# Patient Record
Sex: FEMALE | Race: OTHER | NOT HISPANIC OR LATINO
[De-identification: names, ages, dates, MRNs, and addresses within clinical notes are randomized per-mention and may not be internally consistent; named-entity substitution may affect disease eponyms.]

---

## 2022-01-28 PROBLEM — Z00.00 ENCOUNTER FOR PREVENTIVE HEALTH EXAMINATION: Status: ACTIVE | Noted: 2022-01-28

## 2022-03-01 ENCOUNTER — APPOINTMENT (OUTPATIENT)
Dept: CARDIOLOGY | Facility: CLINIC | Age: 62
End: 2022-03-01
Payer: MEDICAID

## 2022-03-01 ENCOUNTER — EMERGENCY (EMERGENCY)
Facility: HOSPITAL | Age: 62
LOS: 0 days | Discharge: HOME | End: 2022-03-02
Attending: EMERGENCY MEDICINE | Admitting: STUDENT IN AN ORGANIZED HEALTH CARE EDUCATION/TRAINING PROGRAM
Payer: MEDICAID

## 2022-03-01 VITALS
RESPIRATION RATE: 18 BRPM | OXYGEN SATURATION: 98 % | WEIGHT: 175.05 LBS | HEART RATE: 91 BPM | HEIGHT: 63 IN | SYSTOLIC BLOOD PRESSURE: 183 MMHG | TEMPERATURE: 98 F | DIASTOLIC BLOOD PRESSURE: 76 MMHG

## 2022-03-01 VITALS
TEMPERATURE: 96 F | DIASTOLIC BLOOD PRESSURE: 79 MMHG | SYSTOLIC BLOOD PRESSURE: 127 MMHG | HEART RATE: 82 BPM | WEIGHT: 162 LBS | HEIGHT: 63 IN | BODY MASS INDEX: 28.7 KG/M2

## 2022-03-01 DIAGNOSIS — I10 ESSENTIAL (PRIMARY) HYPERTENSION: ICD-10-CM

## 2022-03-01 DIAGNOSIS — R07.89 OTHER CHEST PAIN: ICD-10-CM

## 2022-03-01 DIAGNOSIS — R07.9 CHEST PAIN, UNSPECIFIED: ICD-10-CM

## 2022-03-01 DIAGNOSIS — Z82.3 FAMILY HISTORY OF STROKE: ICD-10-CM

## 2022-03-01 DIAGNOSIS — Z78.9 OTHER SPECIFIED HEALTH STATUS: ICD-10-CM

## 2022-03-01 DIAGNOSIS — Z83.3 FAMILY HISTORY OF DIABETES MELLITUS: ICD-10-CM

## 2022-03-01 DIAGNOSIS — Z82.49 FAMILY HISTORY OF ISCHEMIC HEART DISEASE AND OTHER DISEASES OF THE CIRCULATORY SYSTEM: ICD-10-CM

## 2022-03-01 DIAGNOSIS — E07.9 DISORDER OF THYROID, UNSPECIFIED: ICD-10-CM

## 2022-03-01 DIAGNOSIS — Z20.822 CONTACT WITH AND (SUSPECTED) EXPOSURE TO COVID-19: ICD-10-CM

## 2022-03-01 LAB
ALBUMIN SERPL ELPH-MCNC: 4.6 G/DL — SIGNIFICANT CHANGE UP (ref 3.5–5.2)
ALP SERPL-CCNC: 55 U/L — SIGNIFICANT CHANGE UP (ref 30–115)
ALT FLD-CCNC: 20 U/L — SIGNIFICANT CHANGE UP (ref 0–41)
ANION GAP SERPL CALC-SCNC: 14 MMOL/L — SIGNIFICANT CHANGE UP (ref 7–14)
AST SERPL-CCNC: 44 U/L — HIGH (ref 0–41)
BASOPHILS # BLD AUTO: 0.05 K/UL — SIGNIFICANT CHANGE UP (ref 0–0.2)
BASOPHILS NFR BLD AUTO: 0.9 % — SIGNIFICANT CHANGE UP (ref 0–1)
BILIRUB SERPL-MCNC: 0.3 MG/DL — SIGNIFICANT CHANGE UP (ref 0.2–1.2)
BUN SERPL-MCNC: 12 MG/DL — SIGNIFICANT CHANGE UP (ref 10–20)
CALCIUM SERPL-MCNC: 9.3 MG/DL — SIGNIFICANT CHANGE UP (ref 8.5–10.1)
CHLORIDE SERPL-SCNC: 100 MMOL/L — SIGNIFICANT CHANGE UP (ref 98–110)
CO2 SERPL-SCNC: 23 MMOL/L — SIGNIFICANT CHANGE UP (ref 17–32)
CREAT SERPL-MCNC: 0.7 MG/DL — SIGNIFICANT CHANGE UP (ref 0.7–1.5)
EGFR: 98 ML/MIN/1.73M2 — SIGNIFICANT CHANGE UP
EOSINOPHIL # BLD AUTO: 0.07 K/UL — SIGNIFICANT CHANGE UP (ref 0–0.7)
EOSINOPHIL NFR BLD AUTO: 1.3 % — SIGNIFICANT CHANGE UP (ref 0–8)
GLUCOSE SERPL-MCNC: 89 MG/DL — SIGNIFICANT CHANGE UP (ref 70–99)
HCT VFR BLD CALC: 41.3 % — SIGNIFICANT CHANGE UP (ref 37–47)
HGB BLD-MCNC: 13.2 G/DL — SIGNIFICANT CHANGE UP (ref 12–16)
IMM GRANULOCYTES NFR BLD AUTO: 0.4 % — HIGH (ref 0.1–0.3)
LYMPHOCYTES # BLD AUTO: 1.53 K/UL — SIGNIFICANT CHANGE UP (ref 1.2–3.4)
LYMPHOCYTES # BLD AUTO: 28.8 % — SIGNIFICANT CHANGE UP (ref 20.5–51.1)
MAGNESIUM SERPL-MCNC: 2 MG/DL — SIGNIFICANT CHANGE UP (ref 1.8–2.4)
MCHC RBC-ENTMCNC: 26.2 PG — LOW (ref 27–31)
MCHC RBC-ENTMCNC: 32 G/DL — SIGNIFICANT CHANGE UP (ref 32–37)
MCV RBC AUTO: 81.9 FL — SIGNIFICANT CHANGE UP (ref 81–99)
MONOCYTES # BLD AUTO: 0.48 K/UL — SIGNIFICANT CHANGE UP (ref 0.1–0.6)
MONOCYTES NFR BLD AUTO: 9 % — SIGNIFICANT CHANGE UP (ref 1.7–9.3)
NEUTROPHILS # BLD AUTO: 3.16 K/UL — SIGNIFICANT CHANGE UP (ref 1.4–6.5)
NEUTROPHILS NFR BLD AUTO: 59.6 % — SIGNIFICANT CHANGE UP (ref 42.2–75.2)
NRBC # BLD: 0 /100 WBCS — SIGNIFICANT CHANGE UP (ref 0–0)
NT-PROBNP SERPL-SCNC: 77 PG/ML — SIGNIFICANT CHANGE UP (ref 0–300)
PLATELET # BLD AUTO: 219 K/UL — SIGNIFICANT CHANGE UP (ref 130–400)
POTASSIUM SERPL-MCNC: 5 MMOL/L — SIGNIFICANT CHANGE UP (ref 3.5–5)
POTASSIUM SERPL-SCNC: 5 MMOL/L — SIGNIFICANT CHANGE UP (ref 3.5–5)
PROT SERPL-MCNC: 7.6 G/DL — SIGNIFICANT CHANGE UP (ref 6–8)
RBC # BLD: 5.04 M/UL — SIGNIFICANT CHANGE UP (ref 4.2–5.4)
RBC # FLD: 13.4 % — SIGNIFICANT CHANGE UP (ref 11.5–14.5)
SARS-COV-2 RNA SPEC QL NAA+PROBE: SIGNIFICANT CHANGE UP
SODIUM SERPL-SCNC: 137 MMOL/L — SIGNIFICANT CHANGE UP (ref 135–146)
TROPONIN T SERPL-MCNC: <0.01 NG/ML — SIGNIFICANT CHANGE UP
TROPONIN T SERPL-MCNC: <0.01 NG/ML — SIGNIFICANT CHANGE UP
WBC # BLD: 5.31 K/UL — SIGNIFICANT CHANGE UP (ref 4.8–10.8)
WBC # FLD AUTO: 5.31 K/UL — SIGNIFICANT CHANGE UP (ref 4.8–10.8)

## 2022-03-01 PROCEDURE — 99204 OFFICE O/P NEW MOD 45 MIN: CPT

## 2022-03-01 PROCEDURE — 93000 ELECTROCARDIOGRAM COMPLETE: CPT

## 2022-03-01 PROCEDURE — 71046 X-RAY EXAM CHEST 2 VIEWS: CPT | Mod: 26

## 2022-03-01 PROCEDURE — 99218: CPT

## 2022-03-01 PROCEDURE — 93010 ELECTROCARDIOGRAM REPORT: CPT | Mod: 76

## 2022-03-01 RX ORDER — ASPIRIN/CALCIUM CARB/MAGNESIUM 324 MG
324 TABLET ORAL ONCE
Refills: 0 | Status: COMPLETED | OUTPATIENT
Start: 2022-03-01 | End: 2022-03-01

## 2022-03-01 RX ADMIN — Medication 324 MILLIGRAM(S): at 14:55

## 2022-03-01 NOTE — ED PROVIDER NOTE - OBJECTIVE STATEMENT
62 yo female with a pmh of HTN presents c/o chest pain that started last night. pt describes her pain as pressure in nature to the midsternal region with radiation to her L arm. pt states that pain as been intermittent with no notes aggravating or alleviating factors. pt also mention to sometimes become slightly SOB when climbing stairs over the past month. pt denies any other symptoms including fevers, chill, headache, recent illness/travel, cough, abdominal pain.

## 2022-03-01 NOTE — REASON FOR VISIT
[Arrhythmia/ECG Abnorrmalities] : arrhythmia/ECG abnormalities [FreeTextEntry3] : Dr. Evaristo Ingram

## 2022-03-01 NOTE — HISTORY OF PRESENT ILLNESS
[FreeTextEntry1] : Referring Internist: Dr. Evaristo Ingram\par \par hypertension, hyperlipidemia, \par long standing history of palpitations, for few years, severe, brief episodes\par improved on Toprol 200 mg \par also reports chest pain pressure like, with numbness left arm; last time had chest pain was this am\par sedentary lifestyle; does not exert herself\par She has no shortness of breath, no dyspnea on exertion, no orthopnea, no PND. She denies dizziness, lightheadedness and syncope. She presents for evaluation.\par \par \par

## 2022-03-01 NOTE — DISCUSSION/SUMMARY
[FreeTextEntry1] : Chest pain, left sided, pressure-like; last episode this am\par -recommend continue Metoprolol\par -go to ER immediately for management, troponins, possible CTA coronaries\par -Patient and daughter in law declined calling ambulance; she will go on her own to ER\par \par Palpitations\par -continue Metoprolol\par - Since her symptoms do not occur on a daily basis, a Holter monitor is less likely to be helpful in her management. I recommend a 4 weeks event monitor to evaluate for the etiology of her symptoms. I educated the patient on the use of symptoms’ trigger feature of the event monitor. I will reassess patient after completion of the 4 weeks event monitor.\par \par If MCOT negative, will proceed with ILR\par \par I discussed with her the plan of care in great details. I answered all her questions and she was satisfied with the visit. Patient will follow with me in 4-6 weeks' time. Please do not hesitate to contact me at [phone] if you have any questions regarding her care.\par \par \par

## 2022-03-01 NOTE — ED CDU PROVIDER INITIAL DAY NOTE - OBJECTIVE STATEMENT
60 yo F hx of HTN, tachycardia c/o intermittent left chest pain radiating to left arm since last night. No SOB, n/v, abdominal pains or leg pains/leg swelling. Patient was referred to ED by Dr Weaver for evaluation. No smoking or drugs.

## 2022-03-01 NOTE — ED ADULT NURSE REASSESSMENT NOTE - NS ED NURSE REASSESS COMMENT FT1
Patient is a GCS of 15. Patient can follow complex and simple commands.  Patient educated on medical treatment plan and medications. VSS. Will continue to monitor patient for acute changes in vital signs.

## 2022-03-01 NOTE — ED CDU PROVIDER INITIAL DAY NOTE - PHYSICAL EXAMINATION
Gen: Alert, NAD, well appearing  Head: NC, AT, PERRL, EOMI, normal lids/conjunctiva  ENT: normal hearing,  Neck: +supple, no tenderness/meningismus,  Pulm: Bilateral BS, normal resp effort, no wheeze/stridor/retractions  CV: RRR  Abd: soft, NT/ND  Mskel: no edema/erythema/cyanosis  Skin: no rash, warm/dry  Neuro: AAOx3, no sensory/motor deficits

## 2022-03-01 NOTE — ED PROVIDER NOTE - PROGRESS NOTE DETAILS
contacted by EP who would like pt to be placed in OBS for ccta and contacted prior to discharge for Holter monitor placement.

## 2022-03-01 NOTE — ED PROVIDER NOTE - ATTENDING CONTRIBUTION TO CARE
62 yo f hx htn, episodes of tachycardia presents for eval of CP  pain began last night. pt describes pain as a midsternal pressure. pt endorses some radiation to L arm. pain has been intermittent w/o clear exacerbating or alleviating factors. +mild ORTIZ for the past month. no f/c/cough/n/v/d/ap.  pt had seen EP and was ref to ED for acs r/o    vss  gen- NAD, aaox3  card-rrr  lungs-ctab, no wheezing or rhonchi  abd-sntnd, no guarding or rebound  neuro- full str/sensation, cn ii-xii grossly intact, normal coordination

## 2022-03-02 VITALS
DIASTOLIC BLOOD PRESSURE: 61 MMHG | RESPIRATION RATE: 18 BRPM | SYSTOLIC BLOOD PRESSURE: 124 MMHG | HEART RATE: 72 BPM | OXYGEN SATURATION: 100 %

## 2022-03-02 PROCEDURE — 99217: CPT

## 2022-03-02 PROCEDURE — 78452 HT MUSCLE IMAGE SPECT MULT: CPT | Mod: 26,MA

## 2022-03-02 PROCEDURE — 93018 CV STRESS TEST I&R ONLY: CPT

## 2022-03-02 PROCEDURE — 93016 CV STRESS TEST SUPVJ ONLY: CPT

## 2022-03-02 RX ORDER — METOPROLOL TARTRATE 50 MG
100 TABLET ORAL ONCE
Refills: 0 | Status: COMPLETED | OUTPATIENT
Start: 2022-03-02 | End: 2022-03-02

## 2022-03-02 RX ORDER — ADENOSINE 3 MG/ML
60 INJECTION INTRAVENOUS ONCE
Refills: 0 | Status: COMPLETED | OUTPATIENT
Start: 2022-03-02 | End: 2022-03-02

## 2022-03-02 RX ADMIN — Medication 100 MILLIGRAM(S): at 08:00

## 2022-03-02 RX ADMIN — ADENOSINE 600 MILLIGRAM(S): 3 INJECTION INTRAVENOUS at 11:03

## 2022-03-02 RX ADMIN — ADENOSINE 60 MILLIGRAM(S): 3 INJECTION INTRAVENOUS at 11:14

## 2022-03-02 RX ADMIN — Medication 100 MILLIGRAM(S): at 06:20

## 2022-03-02 NOTE — ED ADULT NURSE REASSESSMENT NOTE - NS ED NURSE REASSESS COMMENT FT1
Received report from prior RN. Pt A&Ox4, ambulatory baseline. Pt on Tele/O2 monitor, by RN station. Fall precautions maintained. Comfort measures offered. No s/s of distress noted, will f/u.

## 2022-03-02 NOTE — CHART NOTE - NSCHARTNOTEFT_GEN_A_CORE
MCOT placed on patient  All instructions given and questions answered  Patient will follow up with Dr Vázquez in 4-6 weeks    No further EP intervention  Recall as needed [id number]
Patient seen and evaluated by Dr Vázquez in the office today for chest pain and palpitations    Plan:  - CCTA to r/o ischemia  - MCOT prior to discharge    Full consult to follow tomorrow  Call EP with questions [phone]

## 2022-03-02 NOTE — ED CDU PROVIDER DISPOSITION NOTE - PATIENT PORTAL LINK FT
You can access the FollowMyHealth Patient Portal offered by [facility] by registering at the following website: [email or web address]. By joining [facility]’s FollowMyHealth portal, you will also be able to view your health information using other applications (apps) compatible with our system.

## 2022-03-02 NOTE — ED CDU PROVIDER DISPOSITION NOTE - CARE PROVIDER_API CALL
Juan R Vázquez (MD)  Cardiac Electrophysiology; Cardiovascular Disease; [facility]  [address] [place] [address]  Phone: [phone]  Fax: [phone]  Follow Up Time: Routine

## 2022-03-02 NOTE — ED CDU PROVIDER DISPOSITION NOTE - CLINICAL COURSE
pt presented to ed for eval of chest pain. pt placed in edou under low risk chest pain protocol. pt with workup including labs wnl, including 2 sets of negative cardiac enzymes, 2 ekg with no ischemic changes, normal cxray, normal nuclear stress test, no events on cardiac monitor. no chest pain at time of dc. pt advised to f/u with cardiology. Return for any concerning signs or symptoms. pt presented to ed for eval of chest pain. pt placed in edou under low risk chest pain protocol. pt with workup including labs wnl, including 2 sets of negative cardiac enzymes, 2 ekg with no ischemic changes, normal cxray, normal nuclear stress test, no events on cardiac monitor. no chest pain at time of dc. pt advised to f/u with cardiology. Return for any concerning signs or symptoms. The patient was also seen by EP given event monitor. outpt f/u for this in 4-6 weeks, pt aware

## 2022-03-03 PROBLEM — I10 ESSENTIAL (PRIMARY) HYPERTENSION: Chronic | Status: ACTIVE | Noted: 2022-03-01

## 2022-04-11 ENCOUNTER — APPOINTMENT (OUTPATIENT)
Dept: CARDIOLOGY | Facility: CLINIC | Age: 62
End: 2022-04-11

## 2022-04-25 ENCOUNTER — NON-APPOINTMENT (OUTPATIENT)
Age: 62
End: 2022-04-25

## 2022-05-24 ENCOUNTER — APPOINTMENT (OUTPATIENT)
Dept: CARDIOLOGY | Facility: CLINIC | Age: 62
End: 2022-05-24
Payer: MEDICAID

## 2022-05-24 VITALS
RESPIRATION RATE: 16 BRPM | WEIGHT: 180 LBS | BODY MASS INDEX: 31.89 KG/M2 | TEMPERATURE: 98 F | SYSTOLIC BLOOD PRESSURE: 120 MMHG | HEART RATE: 80 BPM | HEIGHT: 63 IN | DIASTOLIC BLOOD PRESSURE: 65 MMHG

## 2022-05-24 DIAGNOSIS — E78.00 PURE HYPERCHOLESTEROLEMIA, UNSPECIFIED: ICD-10-CM

## 2022-05-24 DIAGNOSIS — Z87.898 PERSONAL HISTORY OF OTHER SPECIFIED CONDITIONS: ICD-10-CM

## 2022-05-24 PROCEDURE — 99215 OFFICE O/P EST HI 40 MIN: CPT

## 2022-05-24 PROCEDURE — 93000 ELECTROCARDIOGRAM COMPLETE: CPT

## 2022-05-24 NOTE — DISCUSSION/SUMMARY
[FreeTextEntry1] : Ms. Cassidy Peralta is a pleasant 61 year-old woman with hypertension, hyperlipidemia, intermittent palpitations and symptoms suggestive of SVT with sudden onset/offset of palpiations.\par \par -I recommend continue Metoprolol\par \par - I reviewed with patient the results of MCOT in details. \par \par The possible causes of her supraventricular tachycardia are AV francisco j reentry, concealed accessory bypass tract or atrial tachycardia. Due to fact that she is having moderate symptoms, she is a candidate for electrophysiology study and catheter ablation. A discussion of this procedure was discussed at length. She was informed that the procedure would be performed under moderate sedation and the procedure duration is about three hours. The success rate is 90-95% with a 5-10% recurrence. We also discussed possible complications, which include but are not limited to groin complications, bleeding, vascular injury, perforation, arrhythmias, AV block and the need for permanent pacemaker, cardiac tamponade, need for surgery, and rare risks of stroke/heart attack/death.\par \par Procedure will be planned on 6/22/2022\par \par If EP study is negative, I will consider her for EP study.\par \par She verbalized understanding of the discussion and all questions were addressed and answered. She expressed agreement in proceeding with EP study and ablation. My  will be in contact with her for finalizing date, preadmission testing and instructions. Patient will follow with me in 2 months’ time. Please do not hesitate to contact me at [phone] if you have any further questions regarding this patient care.\par

## 2022-05-24 NOTE — HISTORY OF PRESENT ILLNESS
[FreeTextEntry1] : Referring Internist: Dr. Evaristo Ingram\par \par hypertension, hyperlipidemia, \par long standing history of palpitations, for few years, severe, brief episodes\par improved on Toprol 200 mg \par also reports chest pain pressure like, with numbness left arm; last time had chest pain was this am\par sedentary lifestyle; does not exert herself\par seen in March 2021 and given MCOT: no significant arrhythmias. \par Patient had no severe palpitations during wearing MCOT. She had 2 episodes of severe palpitations sudden onset and sudden offset lasting few minutes.\par She has no shortness of breath, no dyspnea on exertion, no orthopnea, no PND. She denies dizziness, lightheadedness and syncope. She presents for evaluation.\par \par \par

## 2022-05-24 NOTE — CARDIOLOGY SUMMARY
[de-identified] : (5/24/2022): sinus rhythm at 80 bpm, no significant ST/T abnormalities\par (3/1/2022): sinus rhythm at 82 bpm, T wave inversion lead III [de-identified] : EVENT MONITOR/MCOT RESULT SUMMARY:\par (see scanned report from tracings)\par \par Dates: 3/2/2022 to 3/31/2022\par Duration: 27.5 days\par Average heart rate (bpm): 77\par Range heart rate (bpm): \par Tachyarrhythmias: no Atrial Fibrillation, no sustained SVT, no sustained VT\par Bradyarrhythmias: no pauses, no significant bradycardia\par PVC burden: <1%\par APC burden: <1%\par \par SUMMARY: No significant arrhythmias [de-identified] : (03/02/2022) Nuclear stress test: no ischemia - EF 71%

## 2022-06-07 ENCOUNTER — TRANSCRIPTION ENCOUNTER (OUTPATIENT)
Age: 62
End: 2022-06-07

## 2022-06-08 ENCOUNTER — OUTPATIENT (OUTPATIENT)
Dept: OUTPATIENT SERVICES | Facility: HOSPITAL | Age: 62
LOS: 1 days | Discharge: HOME | End: 2022-06-08
Payer: MEDICAID

## 2022-06-08 VITALS
HEIGHT: 59.06 IN | WEIGHT: 165.35 LBS | SYSTOLIC BLOOD PRESSURE: 174 MMHG | RESPIRATION RATE: 18 BRPM | OXYGEN SATURATION: 100 % | DIASTOLIC BLOOD PRESSURE: 73 MMHG | HEART RATE: 80 BPM | TEMPERATURE: 98 F

## 2022-06-08 DIAGNOSIS — I47.1 SUPRAVENTRICULAR TACHYCARDIA: ICD-10-CM

## 2022-06-08 DIAGNOSIS — E89.0 POSTPROCEDURAL HYPOTHYROIDISM: Chronic | ICD-10-CM

## 2022-06-08 DIAGNOSIS — Z98.890 OTHER SPECIFIED POSTPROCEDURAL STATES: Chronic | ICD-10-CM

## 2022-06-08 DIAGNOSIS — Z01.818 ENCOUNTER FOR OTHER PREPROCEDURAL EXAMINATION: ICD-10-CM

## 2022-06-08 DIAGNOSIS — Z90.49 ACQUIRED ABSENCE OF OTHER SPECIFIED PARTS OF DIGESTIVE TRACT: Chronic | ICD-10-CM

## 2022-06-08 DIAGNOSIS — Z90.89 ACQUIRED ABSENCE OF OTHER ORGANS: Chronic | ICD-10-CM

## 2022-06-08 LAB
ALBUMIN SERPL ELPH-MCNC: 4.9 G/DL — SIGNIFICANT CHANGE UP (ref 3.5–5.2)
ALP SERPL-CCNC: 58 U/L — SIGNIFICANT CHANGE UP (ref 30–115)
ALT FLD-CCNC: 20 U/L — SIGNIFICANT CHANGE UP (ref 0–41)
ANION GAP SERPL CALC-SCNC: 13 MMOL/L — SIGNIFICANT CHANGE UP (ref 7–14)
APTT BLD: 29.2 SEC — SIGNIFICANT CHANGE UP (ref 27–39.2)
AST SERPL-CCNC: 32 U/L — SIGNIFICANT CHANGE UP (ref 0–41)
BASOPHILS # BLD AUTO: 0.06 K/UL — SIGNIFICANT CHANGE UP (ref 0–0.2)
BASOPHILS NFR BLD AUTO: 1 % — SIGNIFICANT CHANGE UP (ref 0–1)
BILIRUB SERPL-MCNC: 0.4 MG/DL — SIGNIFICANT CHANGE UP (ref 0.2–1.2)
BUN SERPL-MCNC: 8 MG/DL — LOW (ref 10–20)
CALCIUM SERPL-MCNC: 9.2 MG/DL — SIGNIFICANT CHANGE UP (ref 8.5–10.1)
CHLORIDE SERPL-SCNC: 98 MMOL/L — SIGNIFICANT CHANGE UP (ref 98–110)
CO2 SERPL-SCNC: 26 MMOL/L — SIGNIFICANT CHANGE UP (ref 17–32)
CREAT SERPL-MCNC: 0.7 MG/DL — SIGNIFICANT CHANGE UP (ref 0.7–1.5)
EGFR: 98 ML/MIN/1.73M2 — SIGNIFICANT CHANGE UP
EOSINOPHIL # BLD AUTO: 0.09 K/UL — SIGNIFICANT CHANGE UP (ref 0–0.7)
EOSINOPHIL NFR BLD AUTO: 1.5 % — SIGNIFICANT CHANGE UP (ref 0–8)
GLUCOSE SERPL-MCNC: 95 MG/DL — SIGNIFICANT CHANGE UP (ref 70–99)
HCT VFR BLD CALC: 39.7 % — SIGNIFICANT CHANGE UP (ref 37–47)
HGB BLD-MCNC: 12.7 G/DL — SIGNIFICANT CHANGE UP (ref 12–16)
IMM GRANULOCYTES NFR BLD AUTO: 0.3 % — SIGNIFICANT CHANGE UP (ref 0.1–0.3)
INR BLD: 0.97 RATIO — SIGNIFICANT CHANGE UP (ref 0.65–1.3)
LYMPHOCYTES # BLD AUTO: 1.74 K/UL — SIGNIFICANT CHANGE UP (ref 1.2–3.4)
LYMPHOCYTES # BLD AUTO: 29.8 % — SIGNIFICANT CHANGE UP (ref 20.5–51.1)
MCHC RBC-ENTMCNC: 26.4 PG — LOW (ref 27–31)
MCHC RBC-ENTMCNC: 32 G/DL — SIGNIFICANT CHANGE UP (ref 32–37)
MCV RBC AUTO: 82.5 FL — SIGNIFICANT CHANGE UP (ref 81–99)
MONOCYTES # BLD AUTO: 0.57 K/UL — SIGNIFICANT CHANGE UP (ref 0.1–0.6)
MONOCYTES NFR BLD AUTO: 9.8 % — HIGH (ref 1.7–9.3)
NEUTROPHILS # BLD AUTO: 3.35 K/UL — SIGNIFICANT CHANGE UP (ref 1.4–6.5)
NEUTROPHILS NFR BLD AUTO: 57.6 % — SIGNIFICANT CHANGE UP (ref 42.2–75.2)
NRBC # BLD: 0 /100 WBCS — SIGNIFICANT CHANGE UP (ref 0–0)
PLATELET # BLD AUTO: 220 K/UL — SIGNIFICANT CHANGE UP (ref 130–400)
POTASSIUM SERPL-MCNC: 4 MMOL/L — SIGNIFICANT CHANGE UP (ref 3.5–5)
POTASSIUM SERPL-SCNC: 4 MMOL/L — SIGNIFICANT CHANGE UP (ref 3.5–5)
PROT SERPL-MCNC: 7.7 G/DL — SIGNIFICANT CHANGE UP (ref 6–8)
PROTHROM AB SERPL-ACNC: 11.2 SEC — SIGNIFICANT CHANGE UP (ref 9.95–12.87)
RBC # BLD: 4.81 M/UL — SIGNIFICANT CHANGE UP (ref 4.2–5.4)
RBC # FLD: 13.7 % — SIGNIFICANT CHANGE UP (ref 11.5–14.5)
SODIUM SERPL-SCNC: 137 MMOL/L — SIGNIFICANT CHANGE UP (ref 135–146)
WBC # BLD: 5.83 K/UL — SIGNIFICANT CHANGE UP (ref 4.8–10.8)
WBC # FLD AUTO: 5.83 K/UL — SIGNIFICANT CHANGE UP (ref 4.8–10.8)

## 2022-06-08 PROCEDURE — 93010 ELECTROCARDIOGRAM REPORT: CPT

## 2022-06-08 RX ORDER — METOPROLOL TARTRATE 50 MG
1 TABLET ORAL
Qty: 0 | Refills: 0 | DISCHARGE

## 2022-06-08 RX ORDER — AMLODIPINE BESYLATE 2.5 MG/1
1 TABLET ORAL
Qty: 0 | Refills: 0 | DISCHARGE

## 2022-06-08 RX ORDER — CHOLECALCIFEROL (VITAMIN D3) 125 MCG
1 CAPSULE ORAL
Qty: 0 | Refills: 0 | DISCHARGE

## 2022-06-08 RX ORDER — ROSUVASTATIN CALCIUM 5 MG/1
1 TABLET ORAL
Qty: 0 | Refills: 0 | DISCHARGE

## 2022-06-08 RX ORDER — LEVOTHYROXINE SODIUM 125 MCG
1 TABLET ORAL
Qty: 0 | Refills: 0 | DISCHARGE

## 2022-06-08 NOTE — H&P PST ADULT - NSICDXPASTSURGICALHX_GEN_ALL_CORE_FT
PAST SURGICAL HISTORY:  H/O thyroidectomy     Previous back surgery     S/P appendectomy     S/P tonsillectomy

## 2022-06-08 NOTE — H&P PST ADULT - HISTORY OF PRESENT ILLNESS
Patient is a  61 year old female presenting to PAST in preparation for  SVT ABLATION  on 6/22 under sedation anesthesia by Dr. Vázquez  Pt reports she has been dx with svt for many years recently has been experiencing increased episodes and has been advised to have above  PATIENT CURRENTLY DENIES CHEST PAIN  SHORTNESS OF BREATH  PALPITATIONS,  DYSURIA, OR UPPER RESPIRATORY INFECTION IN PAST 2 WEEKS  EXERCISE  TOLERANCE  1-2 FLIGHT OF STAIRS  WITHOUT SHORTNESS OF BREATH    Anesthesia Alert  NO--Difficult Airway  NO--History of neck surgery or radiation  NO--Limited ROM of neck  NO--History of Malignant hyperthermia  NO--Personal or family history of Pseudocholinesterase deficiency  NO--Prior Anesthesia Complication  NO--Latex Allergy  NO--Loose teeth  NO--History of Rheumatoid Arthritis  NO--NORMAN  NO-- BLEEDING RISK  NO--Other_____    As per patient, this is their complete medical and surgical history, including medications both prescribed or over the counter.  Patient verbalized understanding of instructions and was given the opportunity to ask questions and have them answered.  Patient denies any signs or symptoms of COVID 19 and denies contact with known positive individuals.  They have an appointment for  COVID testing pre-procedure and acknowledge its time and place.  They were instructed to quarantine pre-procedure, practice exposure control measures, continue to self-monitor and report any concerns to their proceduralist.

## 2022-06-08 NOTE — H&P PST ADULT - NSICDXFAMILYHX_GEN_ALL_CORE_FT
FAMILY HISTORY:  Father  Still living? Unknown  FH: diabetes mellitus, Age at diagnosis: Age Unknown    Mother  Still living? No  FH: brain tumor, Age at diagnosis: Age Unknown  FH: diabetes mellitus, Age at diagnosis: Age Unknown

## 2022-06-19 ENCOUNTER — LABORATORY RESULT (OUTPATIENT)
Age: 62
End: 2022-06-19

## 2022-06-22 ENCOUNTER — INPATIENT (INPATIENT)
Facility: HOSPITAL | Age: 62
LOS: 0 days | Discharge: HOME | End: 2022-06-23
Attending: STUDENT IN AN ORGANIZED HEALTH CARE EDUCATION/TRAINING PROGRAM | Admitting: STUDENT IN AN ORGANIZED HEALTH CARE EDUCATION/TRAINING PROGRAM
Payer: MEDICAID

## 2022-06-22 VITALS
HEART RATE: 99 BPM | TEMPERATURE: 98 F | DIASTOLIC BLOOD PRESSURE: 91 MMHG | SYSTOLIC BLOOD PRESSURE: 119 MMHG | RESPIRATION RATE: 18 BRPM | OXYGEN SATURATION: 97 %

## 2022-06-22 DIAGNOSIS — E89.0 POSTPROCEDURAL HYPOTHYROIDISM: Chronic | ICD-10-CM

## 2022-06-22 DIAGNOSIS — Z90.89 ACQUIRED ABSENCE OF OTHER ORGANS: Chronic | ICD-10-CM

## 2022-06-22 DIAGNOSIS — I47.1 SUPRAVENTRICULAR TACHYCARDIA: ICD-10-CM

## 2022-06-22 DIAGNOSIS — Z90.49 ACQUIRED ABSENCE OF OTHER SPECIFIED PARTS OF DIGESTIVE TRACT: Chronic | ICD-10-CM

## 2022-06-22 DIAGNOSIS — Z98.890 OTHER SPECIFIED POSTPROCEDURAL STATES: Chronic | ICD-10-CM

## 2022-06-22 PROCEDURE — 93653 COMPRE EP EVAL TX SVT: CPT

## 2022-06-22 PROCEDURE — 93623 PRGRMD STIMJ&PACG IV RX NFS: CPT | Mod: 26

## 2022-06-22 RX ORDER — CEFAZOLIN SODIUM 1 G
2000 VIAL (EA) INJECTION ONCE
Refills: 0 | Status: COMPLETED | OUTPATIENT
Start: 2022-06-22 | End: 2022-06-22

## 2022-06-22 RX ORDER — CHOLECALCIFEROL (VITAMIN D3) 125 MCG
1000 CAPSULE ORAL DAILY
Refills: 0 | Status: DISCONTINUED | OUTPATIENT
Start: 2022-06-22 | End: 2022-06-23

## 2022-06-22 RX ORDER — FAMOTIDINE 10 MG/ML
20 INJECTION INTRAVENOUS ONCE
Refills: 0 | Status: COMPLETED | OUTPATIENT
Start: 2022-06-22 | End: 2022-06-22

## 2022-06-22 RX ORDER — ATORVASTATIN CALCIUM 80 MG/1
40 TABLET, FILM COATED ORAL AT BEDTIME
Refills: 0 | Status: DISCONTINUED | OUTPATIENT
Start: 2022-06-22 | End: 2022-06-23

## 2022-06-22 RX ORDER — LEVOTHYROXINE SODIUM 125 MCG
150 TABLET ORAL DAILY
Refills: 0 | Status: DISCONTINUED | OUTPATIENT
Start: 2022-06-22 | End: 2022-06-23

## 2022-06-22 RX ORDER — ASPIRIN/CALCIUM CARB/MAGNESIUM 324 MG
325 TABLET ORAL DAILY
Refills: 0 | Status: DISCONTINUED | OUTPATIENT
Start: 2022-06-22 | End: 2022-06-22

## 2022-06-22 RX ORDER — AMLODIPINE BESYLATE 2.5 MG/1
5 TABLET ORAL DAILY
Refills: 0 | Status: DISCONTINUED | OUTPATIENT
Start: 2022-06-22 | End: 2022-06-23

## 2022-06-22 RX ORDER — METOPROLOL TARTRATE 50 MG
200 TABLET ORAL DAILY
Refills: 0 | Status: DISCONTINUED | OUTPATIENT
Start: 2022-06-22 | End: 2022-06-23

## 2022-06-22 RX ORDER — GUAIFENESIN/DEXTROMETHORPHAN 600MG-30MG
20 TABLET, EXTENDED RELEASE 12 HR ORAL EVERY 6 HOURS
Refills: 0 | Status: DISCONTINUED | OUTPATIENT
Start: 2022-06-22 | End: 2022-06-23

## 2022-06-22 RX ORDER — ASPIRIN/CALCIUM CARB/MAGNESIUM 324 MG
81 TABLET ORAL DAILY
Refills: 0 | Status: DISCONTINUED | OUTPATIENT
Start: 2022-06-22 | End: 2022-06-23

## 2022-06-22 RX ADMIN — ATORVASTATIN CALCIUM 40 MILLIGRAM(S): 80 TABLET, FILM COATED ORAL at 21:49

## 2022-06-22 RX ADMIN — Medication 200 MILLIGRAM(S): at 14:40

## 2022-06-22 RX ADMIN — Medication 100 MILLIGRAM(S): at 10:09

## 2022-06-22 RX ADMIN — FAMOTIDINE 20 MILLIGRAM(S): 10 INJECTION INTRAVENOUS at 15:03

## 2022-06-22 NOTE — PACU DISCHARGE NOTE - PAIN:
Controlled with current regime Date of Service: 11/07/2019    PREOPERATIVE DIAGNOSIS:  Desires permanent sterilization.    POSTOPERATIVE DIAGNOSIS:  Desires permanent sterilization.    PRINCIPAL PROCEDURE:  Laparoscopic bilateral tubal fulguration.    SURGEON:  Carolann Molina MD.    ASSISTANT:  Carla Torrez SA.    ANESTHESIOLOGIST:    Dr. Cole.    ANESTHESIA:  General endotracheal.    COMPLICATIONS:  None.    CONDITION:  Stable.    ESTIMATED BLOOD LOSS:  Minimal.    DRAINS:  None.    PATHOLOGY SPECIMENS:   None.    INDICATIONS:  The patient is a 39-year-old female, para 6, desiring permanent sterilization.  Following discussion of risks, benefits, side effects, alternate options, the patient strongly desires to proceed.    FINDINGS:  Normal uterus, bilateral tubes and ovaries, normal liver surface    SUMMARY OF PROCEDURE:  The patient was taken to the operating room where general endotracheal anesthesia was given.  SCDs were in place.  No antibiotics were indicated.  Her legs were placed in the Danielito stirrups.  After prepping, draping and doing appropriate time-out, attention was first turned to the vagina.  Bladder was catheterized.  Next, the anterior lip of the cervix was grasped with a single-tooth tenaculum and an acorn manipulator was placed without difficulty.  Speculum was removed.  The patient was made level and attention was turned to the abdomen.  Local was injected into the infraumbilical area.  Scalpel was used to make a 5 mm incision, and on first attempt, slightly high pressures were noted, but on second attempt, low pressures were noted upon insufflation.  The abdomen was insufflated to 15 mmHg, the Veress needle removed and a 5 mm trocar placed without difficulty.  No bleeding or bowel injury was noted.  The patient was placed in Trendelenburg.  Next, attention was turned to the suprapubic area where 2 fingerbreadths above the pubic bone a 5 mm incision was made following additional local injection of 0.25% Marcaine.   Next, a 5 mm trocar was placed under direct laparoscopic guidance, taking care to avoid underlying bowel.  Through this incision, waffle tip bipolar cautery was placed.  Attention was first turned to the right fallopian tube, which was grasped, carried out to its fimbriated end, regrasped in its mid portion and a 3 cm area of the midportion of the right fallopian tube was thoroughly fulgurated using the ammeter to assess for adequate fulguration.  The tube was let go.  Similarly, the left fallopian tube was grasped, carried out to its fimbriated end, regrasped in its mid portion and a 3 cm midportion of the left fallopian tube was thoroughly fulgurated using the ammeter to assess for adequate fulguration and taking care to avoid adjacent structures such as bowel.  The tube was let go.  Next, using the Kleppinger bipolar, additional fulguration was carried out over the same area, again using the ammeter.  Adequate thorough fulguration was done.  At this point, procedure was terminated.  Pictures were obtained.  Next, gas was released and then next instruments were removed and the incisions were reapproximated with a 4-0 Monocryl.  Inferiorly, there was bleeding noted at one of the tenaculum sites and a stitch of 2-0 Vicryl was required in order to maintain hemostasis.  Sponge, lap and instrument count was correct.  The patient was transferred to recovery room in stable condition.      Dictated By: Carolann Molina MD  Signing Provider: Carolann Molina MD    PS/SP2 ([id number])  DD: 11/07/2019 10:35:20 TD: 11/07/2019 10:47:18    Copy Sent To:

## 2022-06-22 NOTE — CHART NOTE - NSCHARTNOTEFT_GEN_A_CORE
Electrophysiology Brief Post-Op Note      I have personally seen and examined the patient.  I agree with the history and physical which I have reviewed and noted any changes below.      PRE-OP DIAGNOSIS: SVT    POST-OP DIAGNOSIS: SVT - fast-slow AVNRT    PROCEDURE:  -Electrophysiology study with induction of arrhythmias  -Ablation of supraventricular arrhythmia  -3D mapping  -Infusion of medicine    Vascular Access used (using Ultrasound Guidance and micropuncture needle)  -Right Femoral Vein: 8F  8F  -Left Femoral Vein: 6F 6F 6F  -Right Femoral Artery: none    All sheaths and wires removed and manual pressure applied.    Physician: Gurpreet  Assistant: None    ANESTHESIA TYPE:  [   ]General Anesthesia  [X] Sedation  [x] Local/Regional    CONDITION  [  ] Critical  [  ] Serious  [  ]Fair  [X]Good    SPECIMENS REMOVED (IF APPLICABLE): NONE  All wires were removed    IMPLANTS (IF APPLICABLE): NONE    FINDINGS (see below)  -Successful ablation of SVT slow pathway modification of AVNRT  -No immediate complications  -ESTIMATED BLOOD LOSS:  15 mL    PLAN OF CARE  -	Start Aspirin 81 mg daily for 30 days  -             Resume Metoprolol  -	Bed rest for 4 hours  -	Admit to telemetry

## 2022-06-22 NOTE — PATIENT PROFILE ADULT - FALL HARM RISK - HARM RISK INTERVENTIONS

## 2022-06-23 ENCOUNTER — TRANSCRIPTION ENCOUNTER (OUTPATIENT)
Age: 62
End: 2022-06-23

## 2022-06-23 VITALS — HEART RATE: 76 BPM | DIASTOLIC BLOOD PRESSURE: 59 MMHG | SYSTOLIC BLOOD PRESSURE: 110 MMHG

## 2022-06-23 DIAGNOSIS — I10 ESSENTIAL (PRIMARY) HYPERTENSION: ICD-10-CM

## 2022-06-23 PROCEDURE — 99233 SBSQ HOSP IP/OBS HIGH 50: CPT

## 2022-06-23 PROCEDURE — 99238 HOSP IP/OBS DSCHRG MGMT 30/<: CPT

## 2022-06-23 PROCEDURE — 93010 ELECTROCARDIOGRAM REPORT: CPT

## 2022-06-23 RX ORDER — ASPIRIN/CALCIUM CARB/MAGNESIUM 324 MG
1 TABLET ORAL
Qty: 30 | Refills: 0
Start: 2022-06-23 | End: 2022-07-22

## 2022-06-23 RX ADMIN — Medication 150 MICROGRAM(S): at 05:45

## 2022-06-23 RX ADMIN — Medication 200 MILLIGRAM(S): at 05:45

## 2022-06-23 RX ADMIN — Medication 20 MILLILITER(S): at 04:45

## 2022-06-23 RX ADMIN — AMLODIPINE BESYLATE 5 MILLIGRAM(S): 2.5 TABLET ORAL at 05:45

## 2022-06-23 NOTE — DISCHARGE NOTE PROVIDER - CARE PROVIDER_API CALL
Juan R Vázquez)  Cardiac Electrophysiology; Cardiovascular Disease; [facility]  [address] [place] [address]  Phone: [phone]  Fax: [phone]  Scheduled Appointment: 07/11/2022 02:30 PM

## 2022-06-23 NOTE — DISCHARGE NOTE NURSING/CASE MANAGEMENT/SOCIAL WORK - NSDCPEFALRISK_GEN_ALL_CORE
For information on Fall & Injury Prevention, visit: [email or web address] OR  [email or web address] OR  https://www.cdc.gov/steadi/patient.html

## 2022-06-23 NOTE — DISCHARGE NOTE NURSING/CASE MANAGEMENT/SOCIAL WORK - PATIENT PORTAL LINK FT
You can access the FollowMyHealth Patient Portal offered by [facility] by registering at the following website: [email or web address]. By joining [employer]’s FollowMyHealth portal, you will also be able to view your health information using other applications (apps) compatible with our system.

## 2022-06-23 NOTE — DISCHARGE NOTE PROVIDER - HOSPITAL COURSE
Patient is a 61 year old female with PMH SVT, HTN presents to [facility] for scheduled SVT ablation. Patient underwent successful SVT ablation with Dr. Vázquez on 6/22/22 without complications. No overnight events, patient currently in NSR on telemetry. Patient stable for discharge home today and will followup with Dr. Vázquez on 7/11/2022 @ 2:30pm Patient is a 61 year old female with PMH SVT, HTN presents to [facility] for scheduled SVT ablation. Patient underwent successful SVT ablation with Dr. Vázquez on 6/22/22 without complications. No overnight events, patient EKG shows NSR. Start Aspirin 81mg daily, also continue taking your Metoprolol Succinate 200mg daily along with your current medications. Patient stable for discharge home today and will followup with Dr. Vázquez on 7/11/2022 @ 2:30pm. Patient is a 61 year old female with PMH SVT and HTN who presented to [facility] for scheduled SVT ablation. Patient underwent successful SVT ablation with Dr. Vázquez on 6/22/22 without complications. No overnight events, patient EKG shows NSR. Start Aspirin 81mg daily, also continue taking your Metoprolol Succinate 200mg daily along with your current medications. Patient stable for discharge home today and will followup with Dr. Vázquez on 7/11/2022 @ 2:30pm.

## 2022-06-23 NOTE — DISCHARGE NOTE PROVIDER - NSDCFUADDINST_GEN_ALL_CORE_FT
- No heavy lifting/straining, Showering allowed  - Please continue taking Aspirin 81mg daily   - Do not lift anything > 10 lbs for 10 days  - You may shower today  - Please do not drive for 3 days  - Do not submerge yourself in water (baths, swimming) for one week  - Please follow- up with Dr. Vázquez on 7/11  @ 2:30pm at [address] [place] [address], [facility] [address]         - No heavy lifting/straining, Showering allowed  - Start taking Aspirin 81mg daily, Continue taking your Metoprolol Succinate 200mg daily  - Continue your current medications  - Do not lift anything > 10 lbs for 10 days  - You may shower today  - Please do not drive for 3 days  - Do not submerge yourself in water (baths, swimming) for one week  - Please follow- up with Dr. Vázquez on 7/11  @ 2:30pm at [facility], [address]         - No heavy lifting/straining, Showering allowed  - Start taking Aspirin 81mg daily, you will take aspirin for 40 days  - Continue taking your Metoprolol Succinate 200mg daily  - Continue your current medications  - Do not lift anything > 10 lbs for 10 days  - You may shower today  - Please do not drive for 3 days  - Do not submerge yourself in water (baths, swimming) for one week  - Please follow- up with Dr. Vázquez on 7/11  @ 2:30pm at [facility], [facility] [address]

## 2022-06-23 NOTE — PROGRESS NOTE ADULT - ASSESSMENT
· Assessment	  A/P  Patient S/P SVT Ablation  - Cont metoprolol  - ASA 81 mg daily x 30 days  - cont other outpt medications  - Pt can shower today  - No heavy lifting x 1 wek  - Follow up with EP in 1 month  - ok to discharge home today

## 2022-06-23 NOTE — DISCHARGE NOTE PROVIDER - ATTENDING DISCHARGE PHYSICAL EXAMINATION:
Bilateral groin access sites are clean, dry, and intact with no hematoma or swelling. No SVT on telemetry. ECG with NSR.

## 2022-06-23 NOTE — PROGRESS NOTE ADULT - SUBJECTIVE AND OBJECTIVE BOX
INTERVAL HPI/OVERNIGHT EVENTS:  Patient s/p SVT ablation.  (AVNRT)  No events over night  Patient in NSR    MEDICATIONS  (STANDING):  amLODIPine   Tablet 5 milliGRAM(s) Oral daily  aspirin  chewable 81 milliGRAM(s) Oral daily  atorvastatin 40 milliGRAM(s) Oral at bedtime  cholecalciferol 1000 Unit(s) Oral daily  levothyroxine 150 MICROGram(s) Oral daily  metoprolol succinate  milliGRAM(s) Oral daily    MEDICATIONS  (PRN):  guaifenesin/dextromethorphan Oral Liquid 20 milliLiter(s) Oral every 6 hours PRN Cough      Allergies    No Known Allergies    Intolerances      Vital Signs Last 24 Hrs  T(C): 36.6 (23 Jun 2022 05:11), Max: 36.6 (23 Jun 2022 05:11)  T(F): 97.9 (23 Jun 2022 05:11), Max: 97.9 (23 Jun 2022 05:11)  HR: 83 (23 Jun 2022 05:11) (76 - 99)  BP: 122/66 (23 Jun 2022 05:11) (119/91 - 122/66)  BP(mean): --  RR: 18 (23 Jun 2022 05:11) (18 - 19)  SpO2: 97% (22 Jun 2022 15:20) (97% - 97%)    HEENT PASCUAL EOMI  Lung CTAB  Heart RRR normal S1 S2  abd +BS soft  groins No hematoma, no bleeding  Ext no C/C/E    LABS:

## 2022-06-23 NOTE — DISCHARGE NOTE PROVIDER - NSDCCPTREATMENT_GEN_ALL_CORE_FT
PRINCIPAL PROCEDURE  Procedure: Catheter ablation, cardiac, for SVT  Findings and Treatment: You underwent SVT ablation on 6/22/22 without complications.

## 2022-06-23 NOTE — DISCHARGE NOTE PROVIDER - NSDCMRMEDTOKEN_GEN_ALL_CORE_FT
amLODIPine 5 mg oral tablet: 1 tab(s) orally once a day  levothyroxine 150 mcg (0.15 mg) oral tablet: 1 tab(s) orally once a day  metoprolol succinate 200 mg oral tablet, extended release: 1 tab(s) orally once a day  pregabalin 165 mg oral tablet, extended release: 1 tab(s) orally once a day (in the evening)  rosuvastatin 10 mg oral tablet: 1 tab(s) orally once a day  Vitamin D3 25 mcg (1000 intl units) oral tablet: 1 tab(s) orally once a day   amLODIPine 5 mg oral tablet: 1 tab(s) orally once a day  aspirin 81 mg oral tablet, chewable: 1 tab(s) orally once a day  levothyroxine 150 mcg (0.15 mg) oral tablet: 1 tab(s) orally once a day  metoprolol succinate 200 mg oral tablet, extended release: 1 tab(s) orally once a day  pregabalin 165 mg oral tablet, extended release: 1 tab(s) orally once a day (in the evening)  rosuvastatin 10 mg oral tablet: 1 tab(s) orally once a day  Vitamin D3 25 mcg (1000 intl units) oral tablet: 1 tab(s) orally once a day

## 2022-06-23 NOTE — DISCHARGE NOTE PROVIDER - NSDCFUSCHEDAPPT_GEN_ALL_CORE_FT
Juan R Vázquez  [facility] Physician [facility]  CARDIOLOGY [address] [facility]  Scheduled Appointment: 07/11/2022

## 2022-06-23 NOTE — PROGRESS NOTE ADULT - ASSESSMENT
A/P  Patient S/P SVT Ablation  - Cont metoprolol  - ASA 81 mg daily x 30 days  - cont other outpt medications  - Pt can shower today  - No heavy lifting x 1 wek  - Follow up with EP in 1 month  - ok to discharge home today

## 2022-06-23 NOTE — PROGRESS NOTE ADULT - SUBJECTIVE AND OBJECTIVE BOX
Patient is a 61 year old female with PMH SVT and HTN who presented to [facility] for scheduled SVT ablation. Patient underwent successful SVT ablation with Dr. Vázquez on 6/22/22 without complications. No overnight events, patient EKG shows NSR. Start Aspirin 81mg daily, also continue taking your Metoprolol Succinate 200mg daily along with your current medications. Patient stable for discharge home today and will followup with Dr. Vázquez on 7/11/2022 @ 2:30pm.    MEDICATIONS  (STANDING):  amLODIPine   Tablet 5 milliGRAM(s) Oral daily  aspirin  chewable 81 milliGRAM(s) Oral daily  atorvastatin 40 milliGRAM(s) Oral at bedtime  cholecalciferol 1000 Unit(s) Oral daily  levothyroxine 150 MICROGram(s) Oral daily  metoprolol succinate  milliGRAM(s) Oral daily    MEDICATIONS  (PRN):  guaifenesin/dextromethorphan Oral Liquid 20 milliLiter(s) Oral every 6 hours PRN Cough      Allergies    No Known Allergies      Vital Signs Last 24 Hrs  T(C): 36.6 (23 Jun 2022 05:11), Max: 36.6 (23 Jun 2022 05:11)  T(F): 97.9 (23 Jun 2022 05:11), Max: 97.9 (23 Jun 2022 05:11)  HR: 83 (23 Jun 2022 05:11) (76 - 99)  BP: 122/66 (23 Jun 2022 05:11) (119/91 - 122/66)  BP(mean): --  RR: 18 (23 Jun 2022 05:11) (18 - 19)  SpO2: 97% (22 Jun 2022 15:20) (97% - 97%)    HEENT PASCUAL EOMI  Lung CTAB  Heart RRR normal S1 S2  abd +BS soft  groins No hematoma, no bleeding  Ext no C/C/E   Patient is a 61 year old female with PMH SVT and HTN who presented to [facility] for scheduled SVT ablation. Patient underwent successful SVT ablation with Dr. Vázquez on 6/22/22 without complications. No overnight events, patient EKG shows NSR. Start Aspirin 81mg daily, also continue taking your Metoprolol Succinate 200mg daily along with your current medications. Patient stable for discharge home today and will followup with Dr. Vázquez on 7/11/2022 @ 2:30pm.    MEDICATIONS  (STANDING):  amLODIPine   Tablet 5 milliGRAM(s) Oral daily  aspirin  chewable 81 milliGRAM(s) Oral daily  atorvastatin 40 milliGRAM(s) Oral at bedtime  cholecalciferol 1000 Unit(s) Oral daily  levothyroxine 150 MICROGram(s) Oral daily  metoprolol succinate  milliGRAM(s) Oral daily    MEDICATIONS  (PRN):  guaifenesin/dextromethorphan Oral Liquid 20 milliLiter(s) Oral every 6 hours PRN Cough      Allergies    No Known Allergies      Vital Signs Last 24 Hrs  T(C): 36.6 (23 Jun 2022 05:11), Max: 36.6 (23 Jun 2022 05:11)  T(F): 97.9 (23 Jun 2022 05:11), Max: 97.9 (23 Jun 2022 05:11)  HR: 83 (23 Jun 2022 05:11) (76 - 99)  BP: 122/66 (23 Jun 2022 05:11) (119/91 - 122/66)  BP(mean): --  RR: 18 (23 Jun 2022 05:11) (18 - 19)  SpO2: 97% (22 Jun 2022 15:20) (97% - 97%)    HEENT PASCUAL EOMI  Lung CTAB  Heart RRR normal S1 S2  abd +BS soft  groins No hematoma, no bleeding  Ext no C/C/E

## 2022-06-28 DIAGNOSIS — Z83.3 FAMILY HISTORY OF DIABETES MELLITUS: ICD-10-CM

## 2022-06-28 DIAGNOSIS — I10 ESSENTIAL (PRIMARY) HYPERTENSION: ICD-10-CM

## 2022-06-28 DIAGNOSIS — E89.0 POSTPROCEDURAL HYPOTHYROIDISM: ICD-10-CM

## 2022-07-11 ENCOUNTER — APPOINTMENT (OUTPATIENT)
Dept: CARDIOLOGY | Facility: CLINIC | Age: 62
End: 2022-07-11

## 2022-07-11 VITALS
SYSTOLIC BLOOD PRESSURE: 118 MMHG | HEIGHT: 63 IN | BODY MASS INDEX: 31.89 KG/M2 | DIASTOLIC BLOOD PRESSURE: 80 MMHG | WEIGHT: 180 LBS | TEMPERATURE: 97.8 F | HEART RATE: 75 BPM

## 2022-07-11 PROBLEM — I47.1 SUPRAVENTRICULAR TACHYCARDIA: Chronic | Status: ACTIVE | Noted: 2022-06-08

## 2022-07-11 PROCEDURE — 99214 OFFICE O/P EST MOD 30 MIN: CPT

## 2022-07-11 PROCEDURE — 93000 ELECTROCARDIOGRAM COMPLETE: CPT

## 2022-07-11 NOTE — CARDIOLOGY SUMMARY
[de-identified] : (7/11/2022): sinus rhythm at 81 bpm, no significant ST/T abnormalities\par (5/24/2022): sinus rhythm at 80 bpm, no significant ST/T abnormalities\par (3/1/2022): sinus rhythm at 82 bpm, T wave inversion lead III [de-identified] : EVENT MONITOR/MCOT RESULT SUMMARY:\par (see scanned report from tracings)\par \par Dates: 3/2/2022 to 3/31/2022\par Duration: 27.5 days\par Average heart rate (bpm): 77\par Range heart rate (bpm): \par Tachyarrhythmias: no Atrial Fibrillation, no sustained SVT, no sustained VT\par Bradyarrhythmias: no pauses, no significant bradycardia\par PVC burden: <1%\par APC burden: <1%\par \par SUMMARY: No significant arrhythmias [de-identified] : (03/02/2022) Nuclear stress test: no ischemia - EF 71% [de-identified] : (6/22/2022) AVNRT Ablation

## 2022-07-11 NOTE — HISTORY OF PRESENT ILLNESS
[FreeTextEntry1] : Referring Internist: Dr. Evaristo Ingram\par \par hypertension, hyperlipidemia, \par long standing history of palpitations, for few years, severe, brief episodes\par improved on Toprol 200 mg \par also reports chest pain pressure like, with numbness left arm; last time had chest pain was this am\par sedentary lifestyle; does not exert herself\par seen in March 2021 and given MCOT: no significant arrhythmias. \par Patient had no severe palpitations during wearing MCOT. She had 2 episodes of severe palpitations sudden onset and sudden offset lasting few minutes.\par She underwent EP study and ablation of AVNRT on 6/22/2022; \par She had COVID after discharge\par She has no shortness of breath, no dyspnea on exertion, no orthopnea, no PND. She denies dizziness, lightheadedness and syncope. She presents for evaluation.\par \par \par

## 2022-07-11 NOTE — DISCUSSION/SUMMARY
[FreeTextEntry1] : Ms. Cassidy Peralta is a pleasant 61 year-old woman with hypertension, hyperlipidemia, intermittent palpitations and symptoms suggestive of SVT with sudden onset/offset of palpitations. MCOT showed no significant arrhythmias. She underwent EP study and ablation of AVNRT on 6/22/2022\par .\par I recommend continue Metoprolol; can titrate dose slowly\par \par Patient has no arrhythmias since the catheter ablation. There are no groin hematomas or bleeding. Patient is pleased with results of catheter ablation although is aware with the risk of recurrent symptoms and need for another ablation. Post ablation care was discussed in great length. I discussed with patient contacting me in case of any symptoms suggestive of recurrence.\par \par I discussed with patient plan of care in great details. I answered all her questions to her satisfaction. Patient was pleased with the visit.\par \par Patient will follow with me in 12 months’ time. Please do not hesitate to contact me at [phone] if you have any further questions regarding this patient care.\par \par \par

## 2023-02-28 ENCOUNTER — APPOINTMENT (OUTPATIENT)
Dept: ELECTROPHYSIOLOGY | Facility: CLINIC | Age: 63
End: 2023-02-28
Payer: COMMERCIAL

## 2023-02-28 VITALS
OXYGEN SATURATION: 97 % | WEIGHT: 157 LBS | HEART RATE: 76 BPM | SYSTOLIC BLOOD PRESSURE: 120 MMHG | BODY MASS INDEX: 27.82 KG/M2 | DIASTOLIC BLOOD PRESSURE: 80 MMHG | HEIGHT: 63 IN

## 2023-02-28 PROCEDURE — 99214 OFFICE O/P EST MOD 30 MIN: CPT

## 2023-02-28 PROCEDURE — 93000 ELECTROCARDIOGRAM COMPLETE: CPT

## 2023-02-28 RX ORDER — ROSUVASTATIN CALCIUM 10 MG/1
10 TABLET, FILM COATED ORAL
Refills: 0 | Status: ACTIVE | COMMUNITY

## 2023-02-28 NOTE — DISCUSSION/SUMMARY
[FreeTextEntry1] : Ms. Cassidy Peralta is a pleasant 62 year-old woman with hypertension, hyperlipidemia, intermittent palpitations and symptoms suggestive of SVT with sudden onset/offset of palpitations. MCOT showed no significant arrhythmias. She underwent EP study and ablation of AVNRT on 6/22/2022\par .\par I recommend continue Metoprolol 200 mg daily.\par \par Patient has no arrhythmias since the catheter ablation. There are no groin hematomas or bleeding. Patient is pleased with results of catheter ablation although is aware with the risk of recurrent symptoms and need for another ablation. Post ablation care was discussed in great length. I discussed with patient contacting me in case of any symptoms suggestive of recurrence.\par \par I recommend MCOT to assess etiology of brief palpitations. Patient is travelling on 3/15/ till 9/15; She will  MCOT on 9/25/2023 from [facility] (4 weeks MCOT)\par \par I discussed with patient plan of care in great details. I answered all her questions to her satisfaction. Patient was pleased with the visit.\par \par Patient will follow with me in 12 months’ time. Please do not hesitate to contact me at [phone] if you have any further questions regarding this patient care.\par \par \par  [EKG obtained to assist in diagnosis and management of assessed problem(s)] : EKG obtained to assist in diagnosis and management of assessed problem(s)

## 2023-02-28 NOTE — CARDIOLOGY SUMMARY
[de-identified] : (2/28/2023): sinus rhythm at 76 bpm, no significant ST/T abnormalities\par (7/11/2022): sinus rhythm at 81 bpm, no significant ST/T abnormalities\par (5/24/2022): sinus rhythm at 80 bpm, no significant ST/T abnormalities\par (3/1/2022): sinus rhythm at 82 bpm, T wave inversion lead III [de-identified] : EVENT MONITOR/MCOT RESULT SUMMARY:\par (see scanned report from tracings)\par \par Dates: 3/2/2022 to 3/31/2022\par Duration: 27.5 days\par Average heart rate (bpm): 77\par Range heart rate (bpm): \par Tachyarrhythmias: no Atrial Fibrillation, no sustained SVT, no sustained VT\par Bradyarrhythmias: no pauses, no significant bradycardia\par PVC burden: <1%\par APC burden: <1%\par \par SUMMARY: No significant arrhythmias [de-identified] : (03/02/2022) Nuclear stress test: no ischemia - EF 71% [de-identified] : (6/22/2022) AVNRT Ablation

## 2023-02-28 NOTE — HISTORY OF PRESENT ILLNESS
[FreeTextEntry1] : Referring Internist: Dr. Evaristo Ingram\par \par hypertension, hyperlipidemia, \par long standing history of palpitations, for few years, severe, brief episodes\par improved on Toprol 200 mg \par also reports chest pain pressure like, with numbness left arm; last time had chest pain was this am\par sedentary lifestyle; does not exert herself\par seen in March 2021 and given MCOT: no significant arrhythmias. \par Patient had no severe palpitations during wearing MCOT. She had 2 episodes of severe palpitations sudden onset and sudden offset lasting few minutes.\par She underwent EP study and ablation of AVNRT on 6/22/2022; \par \par She had COVID after discharge - She reports mild intermittent palpitations occurring every few days. lasting seconds. She has no shortness of breath, no dyspnea on exertion, no orthopnea, no PND. She denies dizziness, lightheadedness and syncope. She presents for evaluation.\par \par \par

## 2023-07-10 ENCOUNTER — APPOINTMENT (OUTPATIENT)
Dept: CARDIOLOGY | Facility: CLINIC | Age: 63
End: 2023-07-10

## 2023-09-25 ENCOUNTER — APPOINTMENT (OUTPATIENT)
Dept: ELECTROPHYSIOLOGY | Facility: CLINIC | Age: 63
End: 2023-09-25

## 2023-11-06 ENCOUNTER — APPOINTMENT (OUTPATIENT)
Dept: ELECTROPHYSIOLOGY | Facility: CLINIC | Age: 63
End: 2023-11-06
Payer: MEDICAID

## 2023-11-06 VITALS — SYSTOLIC BLOOD PRESSURE: 126 MMHG | TEMPERATURE: 98 F | HEART RATE: 71 BPM | DIASTOLIC BLOOD PRESSURE: 79 MMHG

## 2023-11-06 VITALS — WEIGHT: 158 LBS | BODY MASS INDEX: 28 KG/M2 | HEIGHT: 63 IN

## 2023-11-06 DIAGNOSIS — I47.19 OTHER SUPRAVENTRICULAR TACHYCARDIA: ICD-10-CM

## 2023-11-06 PROCEDURE — 99214 OFFICE O/P EST MOD 30 MIN: CPT | Mod: 25

## 2023-11-06 PROCEDURE — 93000 ELECTROCARDIOGRAM COMPLETE: CPT

## 2023-11-06 RX ORDER — AMLODIPINE BESYLATE 5 MG/1
5 TABLET ORAL
Refills: 0 | Status: DISCONTINUED | COMMUNITY
End: 2023-11-06

## 2023-11-06 RX ORDER — METOPROLOL SUCCINATE 100 MG/1
100 TABLET, EXTENDED RELEASE ORAL DAILY
Qty: 180 | Refills: 3 | Status: ACTIVE | COMMUNITY

## 2023-11-19 NOTE — H&P PST ADULT - TEACHING/LEARNING LEARNING PREFERENCES
[facility] EMERGENCY DEPT  eMERGENCY dEPARTMENT eNCOUnter      Pt Name: Kandice Laboy  MRN: [id number]  [address] [facility] [place] 1955  Date of evaluation: 11/19/2023  Provider: Loreda Brittle, MD    [address]       
verbal instruction

## 2024-01-22 ENCOUNTER — APPOINTMENT (OUTPATIENT)
Dept: ELECTROPHYSIOLOGY | Facility: CLINIC | Age: 64
End: 2024-01-22
Payer: MEDICAID

## 2024-01-22 VITALS
HEIGHT: 63 IN | TEMPERATURE: 98 F | BODY MASS INDEX: 28 KG/M2 | WEIGHT: 158 LBS | DIASTOLIC BLOOD PRESSURE: 83 MMHG | SYSTOLIC BLOOD PRESSURE: 136 MMHG | HEART RATE: 74 BPM

## 2024-01-22 DIAGNOSIS — I49.8 OTHER SPECIFIED CARDIAC ARRHYTHMIAS: ICD-10-CM

## 2024-01-22 DIAGNOSIS — I10 ESSENTIAL (PRIMARY) HYPERTENSION: ICD-10-CM

## 2024-01-22 DIAGNOSIS — E78.00 PURE HYPERCHOLESTEROLEMIA, UNSPECIFIED: ICD-10-CM

## 2024-01-22 DIAGNOSIS — R00.0 TACHYCARDIA, UNSPECIFIED: ICD-10-CM

## 2024-01-22 PROCEDURE — 93000 ELECTROCARDIOGRAM COMPLETE: CPT

## 2024-01-22 PROCEDURE — 99214 OFFICE O/P EST MOD 30 MIN: CPT | Mod: 25

## 2024-01-22 RX ORDER — AMLODIPINE BESYLATE 5 MG/1
5 TABLET ORAL
Qty: 90 | Refills: 3 | Status: ACTIVE | COMMUNITY

## 2024-01-22 RX ORDER — LEVOTHYROXINE SODIUM 175 UG/1
175 TABLET ORAL DAILY
Refills: 0 | Status: ACTIVE | COMMUNITY

## 2024-01-22 RX ORDER — DILTIAZEM HYDROCHLORIDE 120 MG/1
120 CAPSULE, EXTENDED RELEASE ORAL DAILY
Qty: 90 | Refills: 3 | Status: COMPLETED | COMMUNITY
Start: 2023-11-06 | End: 2024-01-22

## 2024-01-22 NOTE — HISTORY OF PRESENT ILLNESS
[FreeTextEntry1] : Referring Internist: Dr. Evaristo Ingram  hypertension, hyperlipidemia,  long standing history of palpitations, for few years, severe, brief episodes improved on Toprol 200 mg  also reports chest pain pressure like, with numbness left arm; last time had chest pain was this am sedentary lifestyle; does not exert herself. seen in March 2021 and given MCOT: no significant arrhythmias.  Patient had no severe palpitations during wearing MCOT. She had 2 episodes of severe palpitations sudden onset and sudden offset lasting few minutes. She underwent EP study and ablation of AVNRT on 6/22/2022; She had COVID after discharge.  10/2/2023: MCOT showed symptoms correlate with APCs/PVCs 7 out of 11 times. Average HR 74. no significant arrhythmias.  11/6/2023: - She reports mild intermittent palpitations 2 different types. skipped beats lasting ~ 1 sec and occurring every day. second type brief severe palpitations lasting few seconds occurring every 1-2 week. lasting seconds.  1/22/2024: palpitations brief unchanged, occurring every day. She has no shortness of breath, no dyspnea on exertion, no orthopnea, no PND. She denies dizziness, lightheadedness and syncope. She presents for evaluation.

## 2024-01-22 NOTE — CARDIOLOGY SUMMARY
[de-identified] : 10/2/2023: MCOT showed symptoms correlate with APCs/PVCs 7 out of 11 times. Average HR 74. no significant arrhythmias.  EVENT MONITOR/MCOT RESULT SUMMARY: (see scanned report from tracings)  Dates: 3/2/2022 to 3/31/2022 Duration: 27.5 days Average heart rate (bpm): 77 Range heart rate (bpm):  Tachyarrhythmias: no Atrial Fibrillation, no sustained SVT, no sustained VT Bradyarrhythmias: no pauses, no significant bradycardia PVC burden: <1% APC burden: <1%  SUMMARY: No significant arrhythmias [de-identified] : (1/22/2024): sinus rhythm at 74 bpm, no significant ST/T abnormalities (11/6/2023): sinus rhythm at 71 bpm, no significant ST/T abnormalities (2/28/2023): sinus rhythm at 76 bpm, no significant ST/T abnormalities (7/11/2022): sinus rhythm at 81 bpm, no significant ST/T abnormalities (5/24/2022): sinus rhythm at 80 bpm, no significant ST/T abnormalities (3/1/2022): sinus rhythm at 82 bpm, T wave inversion lead III [de-identified] : (03/02/2022) Nuclear stress test: no ischemia - EF 71% [de-identified] : (6/22/2022) AVNRT Ablation

## 2024-01-22 NOTE — DISCUSSION/SUMMARY
[EKG obtained to assist in diagnosis and management of assessed problem(s)] : EKG obtained to assist in diagnosis and management of assessed problem(s) [FreeTextEntry1] : Ms. Cassidy Peralta is a pleasant 63-year-old woman with hypertension, hyperlipidemia, intermittent palpitations and symptoms suggestive of SVT with sudden onset/offset of palpitations. MCOT showed no significant arrhythmias. She underwent EP study and ablation of AVNRT on 6/22/2022. Post ablation she had brief palpitations consistent with PVCs. She was started on Diltiazem but stopped it after 10 days due to headache and high BP.  . I recommend continue Metoprolol 200 mg daily.  Patient has no arrhythmias since the catheter ablation. There are no groin hematomas or bleeding. Patient is pleased with results of catheter ablation although is aware with the risk of recurrent symptoms and need for another ablation. Post ablation care was discussed in great length. I discussed with patient contacting me in case of any symptoms suggestive of recurrence.  I reviewed MCOT result with patient.  I discussed with patient plan of care in great details. I answered all her questions to her satisfaction. Patient was pleased with the visit.  Patient will follow with me in 9 months' time. Please do not hesitate to contact me at [phone] if you have any further questions regarding this patient care.

## 2024-02-19 ENCOUNTER — APPOINTMENT (OUTPATIENT)
Dept: ORTHOPEDIC SURGERY | Facility: CLINIC | Age: 64
End: 2024-02-19
Payer: MEDICAID

## 2024-02-19 DIAGNOSIS — M16.0 BILATERAL PRIMARY OSTEOARTHRITIS OF HIP: ICD-10-CM

## 2024-02-19 DIAGNOSIS — M25.511 PAIN IN RIGHT SHOULDER: ICD-10-CM

## 2024-02-19 DIAGNOSIS — M25.512 PAIN IN RIGHT SHOULDER: ICD-10-CM

## 2024-02-19 PROCEDURE — 73522 X-RAY EXAM HIPS BI 3-4 VIEWS: CPT

## 2024-02-19 PROCEDURE — 73030 X-RAY EXAM OF SHOULDER: CPT | Mod: 50

## 2024-02-19 PROCEDURE — 99203 OFFICE O/P NEW LOW 30 MIN: CPT

## 2024-02-19 NOTE — HISTORY OF PRESENT ILLNESS
[de-identified] : The patient is a 63-year-old female who is here today for evaluation of atraumatic bilateral shoulder pain and bilateral hip pain.  She reports bilateral shoulder pain that has been going on for several months.  The pain for started in her right shoulder and then she also began having left shoulder pain after that.  She has pain with overhead activity.  She also reports pain in the bilateral hips that started about a year ago.  She describes pain over the lateral aspect of her hip.  She also reports low back pain with radiating pain and numbness down the legs.  No red flag symptoms.  No bowel or bladder dysfunction.  She has seen a neurologist for evaluation.  She had a nerve conduction study that demonstrated lumbar radiculopathy right worse than left.  She has been doing physical therapy.  She has also been taking meloxicam.  These treatment modalities have not significantly improved her pain in multiple joints.  She is accompanied by her son today.  She will be going back to [place] on 2/28/2024 and will be there for about 6 months before returning.  Past medical history: Hypertension, thyroid problem, heart problems lumbar problems, neck pain  Past surgical history: Heart ablation, thyroidectomy, cholecystectomy  Allergies: None reported  Current medications: Levothyroxine, amlodipine, metoprolol, rosuvastatin, pregabalin, meloxicam  Family history: Heart issues, diabetes, thyroid disorders  Social history:  Denies alcohol use Denies tobacco use Recreational drug use not reported  Review of systems: A 10-point review of systems was positive for joint pain, joint swelling, back pain, neck pain, heart disease, high blood pressure, numbness, otherwise unremarkable as noted on the new patient questionnaire dated 2/19/2024  The patient is well-appearing.  She ambulates with a normal gait with no limp and no assistive devices.  Her height is 4 foot 10 and her weight is 158 pounds.  Examination of the bilateral shoulders demonstrates mild tenderness to palpation over the bicipital groove, greater tuberosity, acromion, and AC joints bilaterally.  She has a positive Neer's and Rosenbaum bilaterally.  She is able to actively abduct her shoulders to about 170 degrees bilaterally.  She has 5 out of 5 strength across the rotator cuff bilaterally.  Examination of the bilateral hips demonstrates no tenderness to palpation over the greater trochanters.  She has some pain with terminal hip flexion as well as internal and external rotation with the hip flexed.  She has more pain with hip internal rotation and external rotation.  She has reasonably supple hip range of motion.  She has a negative straight leg raise bilaterally.  Bilateral shoulder x-rays taken in the office today demonstrate minimal degenerative changes with maintained joint spaces Bilateral hip x-rays taken in the office today were personally reviewed, demonstrating bilateral degenerative changes, right worse than left.

## 2024-02-19 NOTE — REASON FOR VISIT
[FreeTextEntry2] : Bilateral shoulder pain, bilateral hip pain
This document is complete and the patient is ready for discharge.

## 2024-02-19 NOTE — ASSESSMENT
[FreeTextEntry1] : Assessment: Bilateral hip arthritis Bilateral shoulder pain  Plan: 1.  Clinical and radiographic findings were reviewed with the patient and her son 2.  I discussed treatment options with her.  She is already taking meloxicam and has been doing physical therapy without much improvement.  I discussed the option of a referral to pain management for evaluation of a possible image guided cortisone injection for her hip arthritis.  She will be going out of the country at the end of the month and will not be returning for 6 months.  I provided him with a referral to see pain management when she returns. 3.  I also discussed that we will have her see my spine partner for further evaluation of her lumbar radiculopathy.  She will also do this when she returns from her trip in 6 months. 4.  I will be happy to see her back in the office when she returns from her trip for repeat evaluation. 5.  Plan of care was reviewed with the patient and her son.  They are in agreement and all questions were answered.

## 2024-06-20 NOTE — ED CDU PROVIDER INITIAL DAY NOTE - INTERPRETATION SERVICES DECLINED
Action Requested: Summary for Provider     []  Critical Lab, Recommendations Needed  [] Need Additional Advice  []   FYI    []   Need Orders  [] Need Medications Sent to Pharmacy  []  Other     SUMMARY:appointment made, stuck a qtip in left ear trying to remove wax, wash out/debrox drop, earpain started yesterday    Reason for call: Earache  Onset: 1 week                    Reason for Disposition   Patient wants to be seen    Protocols used: Earache-A-OH     Patient/Caregiver requests family/friend to interpret.

## 2024-09-16 ENCOUNTER — APPOINTMENT (OUTPATIENT)
Dept: ORTHOPEDIC SURGERY | Facility: CLINIC | Age: 64
End: 2024-09-16
Payer: COMMERCIAL

## 2024-09-16 DIAGNOSIS — M54.12 RADICULOPATHY, CERVICAL REGION: ICD-10-CM

## 2024-09-16 DIAGNOSIS — M54.16 RADICULOPATHY, LUMBAR REGION: ICD-10-CM

## 2024-09-16 PROCEDURE — 72040 X-RAY EXAM NECK SPINE 2-3 VW: CPT

## 2024-09-16 PROCEDURE — 72110 X-RAY EXAM L-2 SPINE 4/>VWS: CPT

## 2024-09-16 PROCEDURE — 99204 OFFICE O/P NEW MOD 45 MIN: CPT

## 2024-09-16 NOTE — IMAGING
[de-identified] : TTP midline cervical spine and paraspinal musculature   Strength                                                                     Deltoid   Right: 5/5; Left: 5/5                      Biceps   Right: 5/5; Left: 5/5                   Triceps        Right: 5/5; Left: 5/5                                 Wrist Extensors     Right: 5/5; Left: 5/5 Finger Flexors     Right: 5/5; Left: 5/5 IO    Right: 5/5; Left: 5/5  Sensation C5   Right: 2/2; Left: 2/2 C6   Right: 2/2; Left: 2/2 C7   Right: 2/2; Left: 2/2 C8   Right: 2/2; Left: 2/2 T1   Right: 2/2; Left: 2/2  Reflexes Biceps   Right: 2+; Left 2+ Triceps   Right: 2+; Left 2+ Guevara's  Right: Negative; L: Negative  TTP midline spine and paraspinal musculature  Strength                                          Hip flexor   Right: 5/5; Left: 5/5                              Knee extensor     Right: 5/5; Left: 5/5                      Ankle dorsiflexion   Right: 5/5; Left: 5/5                   EHL           Right: 5/5; Left: 5/5                                 Ankle plantarflexion       Right: 5/5; Left: 5/5  Sensation L1   Right: 2/2; Left: 2/2 L2   Right: 2/2; Left: 2/2 L3   Right: 2/2; Left: 2/2 L4   Right: 2/2; Left: 2/2 L5   Right: 2/2; Left: 2/2 S1   Right: 2/2; Left: 2/2  Reflexes Patella   Right: 2+; Left 2+ Achilles   Right: 2+; Left 2+ Clonus  Right: absent; L: absent

## 2024-09-16 NOTE — DATA REVIEWED
[FreeTextEntry1] : Obtained and reviewed AP lateral extension lumbar and cervical x-rays.  The patient instrumentation L4-5 is in appropriate position.  There appears to be an interbody fusion at L4-5.  Cervical spine has some diffuse degenerative disc disease no dynamic instability.

## 2024-09-16 NOTE — HISTORY OF PRESENT ILLNESS
[de-identified] : 63-year-old female presents to me with lumbar and cervical pain.  The patient has a history of an L4-5 posterior spine instrumented fusion done in [place].  Since surgery she has been having heaviness and weakness in her right leg she feels into her right thigh sometimes she gets numbness in her right foot.  She has no symptoms in the left leg.  Her neck pain radiates to her shoulders sometimes goes to her hands.  She gets plasma injections in [place] and thinks it was helps.  She is also done physical therapy.  She does not have an MRI of her lumbar spine.  She does have a cervical spine MRI report with her.

## 2024-09-16 NOTE — DISCUSSION/SUMMARY
[de-identified] : 63-year-old female presents to me with symptoms of lumbar spinal stenosis.  It is possible she has some adjacent segment disease versus residual stenosis at L4-5.  I am ordering an MRI of her lumbar spine given that she has done and failed physical therapy.  I will see her back after the MRI lumbar spine is complete.  With guards her cervical spine she states she will continue to take PRP injections in [place].

## 2024-09-17 NOTE — ED CDU PROVIDER SUBSEQUENT DAY NOTE - NSICDXFAMILYHX_GEN_ALL_CORE_FT
Per Dr. Serna, wait for attending Dr. Rogel to reassess pt before DC. Pt's mom states she can no longer wait for DC paperwork or MD reassessment. Mom states "I will take off these wires myself. He's fine. We need to go home now. I have work in the morning." Pt sleeping, . No wheezing noted. CM and pulse ox removed from pt. Parents left with pt. Decreased work of breathing noted s/p nebulizer treatments. Pt currently sleeping. Breathing even, even chest rise and fall. Pt tachycardic - Dr. Gordillo notified - no intervention wanted at this time. Parents remain at bedside. FAMILY HISTORY:  No pertinent family history in first degree relatives

## 2024-09-23 ENCOUNTER — APPOINTMENT (OUTPATIENT)
Dept: MRI IMAGING | Facility: CLINIC | Age: 64
End: 2024-09-23
Payer: COMMERCIAL

## 2024-09-23 PROCEDURE — 72148 MRI LUMBAR SPINE W/O DYE: CPT

## 2024-09-30 ENCOUNTER — APPOINTMENT (OUTPATIENT)
Dept: ORTHOPEDIC SURGERY | Facility: CLINIC | Age: 64
End: 2024-09-30
Payer: COMMERCIAL

## 2024-09-30 PROCEDURE — 99213 OFFICE O/P EST LOW 20 MIN: CPT

## 2024-09-30 NOTE — HISTORY OF PRESENT ILLNESS
[de-identified] : 63-year-old female presents for follow-up.  She got an MRI of her lumbar spine done.  She is here for review.  She continues to have pain.  It goes from her low back down her leg sometimes stops the knee sometimes goes down to the ankles.

## 2024-09-30 NOTE — DATA REVIEWED
[FreeTextEntry1] : I reviewed the patient's MRI of her lumbar spine.  She has degenerative disc disease and adjacent segment disease at L3-4 and L5-S1 with moderate lateral recess stenosis and foraminal stenosis.

## 2024-09-30 NOTE — DISCUSSION/SUMMARY
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  I am recommending follow-up with pain management to discuss injections.  If that fails she would be a candidate for extension of fusion L3-S1 with decompression.  I will see her back in 3 months to check her progress in the meantime she will follow-up with Dr. Tristan Mills.

## 2024-10-01 ENCOUNTER — APPOINTMENT (OUTPATIENT)
Dept: ORTHOPEDIC SURGERY | Facility: CLINIC | Age: 64
End: 2024-10-01

## 2024-10-08 ENCOUNTER — APPOINTMENT (OUTPATIENT)
Dept: PAIN MANAGEMENT | Facility: CLINIC | Age: 64
End: 2024-10-08
Payer: COMMERCIAL

## 2024-10-08 DIAGNOSIS — M54.16 RADICULOPATHY, LUMBAR REGION: ICD-10-CM

## 2024-10-08 PROCEDURE — 99204 OFFICE O/P NEW MOD 45 MIN: CPT

## 2024-10-14 ENCOUNTER — APPOINTMENT (OUTPATIENT)
Dept: ORTHOPEDIC SURGERY | Facility: CLINIC | Age: 64
End: 2024-10-14
Payer: COMMERCIAL

## 2024-10-14 DIAGNOSIS — M16.0 BILATERAL PRIMARY OSTEOARTHRITIS OF HIP: ICD-10-CM

## 2024-10-14 DIAGNOSIS — M75.81 OTHER SHOULDER LESIONS, RIGHT SHOULDER: ICD-10-CM

## 2024-10-14 PROCEDURE — 73502 X-RAY EXAM HIP UNI 2-3 VIEWS: CPT

## 2024-10-14 PROCEDURE — 99213 OFFICE O/P EST LOW 20 MIN: CPT

## 2024-10-21 ENCOUNTER — APPOINTMENT (OUTPATIENT)
Dept: ELECTROPHYSIOLOGY | Facility: CLINIC | Age: 64
End: 2024-10-21
Payer: COMMERCIAL

## 2024-10-21 VITALS
WEIGHT: 165 LBS | HEIGHT: 58 IN | HEART RATE: 74 BPM | DIASTOLIC BLOOD PRESSURE: 83 MMHG | BODY MASS INDEX: 34.63 KG/M2 | SYSTOLIC BLOOD PRESSURE: 138 MMHG | TEMPERATURE: 97.1 F

## 2024-10-21 DIAGNOSIS — I49.8 OTHER SPECIFIED CARDIAC ARRHYTHMIAS: ICD-10-CM

## 2024-10-21 DIAGNOSIS — I10 ESSENTIAL (PRIMARY) HYPERTENSION: ICD-10-CM

## 2024-10-21 DIAGNOSIS — E78.00 PURE HYPERCHOLESTEROLEMIA, UNSPECIFIED: ICD-10-CM

## 2024-10-21 DIAGNOSIS — R00.2 PALPITATIONS: ICD-10-CM

## 2024-10-21 PROCEDURE — 99214 OFFICE O/P EST MOD 30 MIN: CPT | Mod: 25

## 2024-10-21 PROCEDURE — 93000 ELECTROCARDIOGRAM COMPLETE: CPT

## 2024-10-21 RX ORDER — BUDESONIDE 180 UG/1
180 AEROSOL, POWDER RESPIRATORY (INHALATION)
Refills: 0 | Status: ACTIVE | COMMUNITY
Start: 2024-09-26

## 2024-10-21 RX ORDER — LEVOTHYROXINE SODIUM 0.2 MG/1
200 TABLET ORAL
Qty: 90 | Refills: 0 | Status: ACTIVE | COMMUNITY
Start: 2024-10-04

## 2024-10-24 ENCOUNTER — APPOINTMENT (OUTPATIENT)
Dept: PAIN MANAGEMENT | Facility: CLINIC | Age: 64
End: 2024-10-24

## 2024-10-24 ENCOUNTER — APPOINTMENT (OUTPATIENT)
Facility: CLINIC | Age: 64
End: 2024-10-24

## 2024-11-07 ENCOUNTER — APPOINTMENT (OUTPATIENT)
Dept: PAIN MANAGEMENT | Facility: CLINIC | Age: 64
End: 2024-11-07

## 2024-11-25 ENCOUNTER — APPOINTMENT (OUTPATIENT)
Facility: CLINIC | Age: 64
End: 2024-11-25

## 2024-11-25 ENCOUNTER — APPOINTMENT (OUTPATIENT)
Dept: PAIN MANAGEMENT | Facility: CLINIC | Age: 64
End: 2024-11-25

## 2024-12-06 ENCOUNTER — APPOINTMENT (OUTPATIENT)
Dept: PAIN MANAGEMENT | Facility: CLINIC | Age: 64
End: 2024-12-06

## 2024-12-23 ENCOUNTER — APPOINTMENT (OUTPATIENT)
Dept: ORTHOPEDIC SURGERY | Facility: CLINIC | Age: 64
End: 2024-12-23

## 2025-04-14 ENCOUNTER — APPOINTMENT (OUTPATIENT)
Dept: ORTHOPEDIC SURGERY | Facility: CLINIC | Age: 65
End: 2025-04-14